# Patient Record
Sex: FEMALE | URBAN - METROPOLITAN AREA
[De-identification: names, ages, dates, MRNs, and addresses within clinical notes are randomized per-mention and may not be internally consistent; named-entity substitution may affect disease eponyms.]

---

## 2017-08-01 ENCOUNTER — EMERGENCY (EMERGENCY)
Facility: HOSPITAL | Age: 57
LOS: 1 days | Discharge: OTHER INSTITUTION | End: 2017-08-01
Attending: STUDENT IN AN ORGANIZED HEALTH CARE EDUCATION/TRAINING PROGRAM
Payer: COMMERCIAL

## 2017-08-01 VITALS
HEIGHT: 64 IN | TEMPERATURE: 98 F | DIASTOLIC BLOOD PRESSURE: 108 MMHG | SYSTOLIC BLOOD PRESSURE: 179 MMHG | WEIGHT: 139.99 LBS | HEART RATE: 80 BPM | OXYGEN SATURATION: 100 % | RESPIRATION RATE: 17 BRPM

## 2017-08-01 DIAGNOSIS — Z98.89 OTHER SPECIFIED POSTPROCEDURAL STATES: Chronic | ICD-10-CM

## 2017-08-01 LAB
APAP SERPL-MCNC: <7.5 UG/ML — LOW (ref 10–26)
BASOPHILS # BLD AUTO: 0 K/UL — SIGNIFICANT CHANGE UP (ref 0–0.2)
BASOPHILS NFR BLD AUTO: 0.3 % — SIGNIFICANT CHANGE UP (ref 0–2)
EOSINOPHIL # BLD AUTO: 0.2 K/UL — SIGNIFICANT CHANGE UP (ref 0–0.5)
EOSINOPHIL NFR BLD AUTO: 1.4 % — SIGNIFICANT CHANGE UP (ref 0–6)
ETHANOL SERPL-MCNC: <10 MG/DL — SIGNIFICANT CHANGE UP
HCG SERPL-ACNC: <2 MIU/ML — SIGNIFICANT CHANGE UP
HCT VFR BLD CALC: 43.3 % — SIGNIFICANT CHANGE UP (ref 37–47)
HGB BLD-MCNC: 14.8 G/DL — SIGNIFICANT CHANGE UP (ref 12–16)
LYMPHOCYTES # BLD AUTO: 3.5 K/UL — SIGNIFICANT CHANGE UP (ref 1–4.8)
LYMPHOCYTES # BLD AUTO: 33 % — SIGNIFICANT CHANGE UP (ref 20–55)
MCHC RBC-ENTMCNC: 30.3 PG — SIGNIFICANT CHANGE UP (ref 27–31)
MCHC RBC-ENTMCNC: 34.2 G/DL — SIGNIFICANT CHANGE UP (ref 32–36)
MCV RBC AUTO: 88.7 FL — SIGNIFICANT CHANGE UP (ref 81–99)
MONOCYTES # BLD AUTO: 0.6 K/UL — SIGNIFICANT CHANGE UP (ref 0–0.8)
MONOCYTES NFR BLD AUTO: 5.4 % — SIGNIFICANT CHANGE UP (ref 3–10)
NEUTROPHILS # BLD AUTO: 6.4 K/UL — SIGNIFICANT CHANGE UP (ref 1.8–8)
NEUTROPHILS NFR BLD AUTO: 59.7 % — SIGNIFICANT CHANGE UP (ref 37–73)
PLATELET # BLD AUTO: 224 K/UL — SIGNIFICANT CHANGE UP (ref 150–400)
RBC # BLD: 4.88 M/UL — SIGNIFICANT CHANGE UP (ref 4.4–5.2)
RBC # FLD: 13.5 % — SIGNIFICANT CHANGE UP (ref 11–15.6)
SALICYLATES SERPL-MCNC: <2 MG/DL — LOW (ref 10–20)
WBC # BLD: 10.7 K/UL — SIGNIFICANT CHANGE UP (ref 4.8–10.8)
WBC # FLD AUTO: 10.7 K/UL — SIGNIFICANT CHANGE UP (ref 4.8–10.8)

## 2017-08-01 PROCEDURE — 93010 ELECTROCARDIOGRAM REPORT: CPT

## 2017-08-01 PROCEDURE — 99285 EMERGENCY DEPT VISIT HI MDM: CPT

## 2017-08-01 RX ORDER — METFORMIN HYDROCHLORIDE 850 MG/1
1 TABLET ORAL
Qty: 0 | Refills: 0 | COMMUNITY

## 2017-08-01 RX ADMIN — Medication 1 MILLIGRAM(S): at 23:04

## 2017-08-01 NOTE — ED ADULT NURSE NOTE - CHIEF COMPLAINT QUOTE
Pt axox3 BIBA  from home stating she is having an allergic rx to " green powder " that someone is putting in her shoes and soap. No  respiratory distress noted, pt ambulatory and appropriate at this time. Pt denies any HI/SI and psych hx.   Eliseo used for assessment

## 2017-08-01 NOTE — ED PROVIDER NOTE - PROGRESS NOTE DETAILS
Labs normal. Neuro exam normal. pt cleared medically and will be evaluated by  for paranoid delusions As per signout from Dr. Mckinnon, patient is medically cleared and awaiting pyschiatric consultation. Case discussed with the psych team whom states that due to the patient's condition she will need admission. Forms completed. Awaiting transport to [facility]. As per sign-out from Dr. Mckinnon, patient is medically cleared and awaiting psychiatric consultation.

## 2017-08-01 NOTE — ED PROVIDER NOTE - MEDICAL DECISION MAKING DETAILS
MDM: psych vs intoxication ve electrolyte abnormality  labs and neuro exam normal. Likely psych etiology MDM: psych vs intoxication ve electrolyte abnormality  labs and neuro exam normal. Likely psych etiology. Psych to admit

## 2017-08-01 NOTE — ED ADULT TRIAGE NOTE - CHIEF COMPLAINT QUOTE
Pt axox3 BIBA  from home stating she is having an allergic rx to " green powder " that someone is putting in her shoes. No  respiratory distress noted, pt ambulatory and appropriate at this time. Pt denies any HI/SI Pt axox3 BIBA  from home stating she is having an allergic rx to " green powder " that someone is putting in her shoes and soap. No  respiratory distress noted, pt ambulatory and appropriate at this time. Pt denies any HI/SI and psych hx.   Eliseo used for assessment

## 2017-08-01 NOTE — ED PROVIDER NOTE - CARE PLAN
Principal Discharge DX:	Paranoid delusion Principal Discharge DX:	Paranoid delusion  Secondary Diagnosis:	Severe single current episode of major depressive disorder, with psychotic features

## 2017-08-01 NOTE — ED PROVIDER NOTE - OBJECTIVE STATEMENT
57 year old female with PMH Dm presenting with sensory disturbances. Pt states that she believes people to be "poisoning her by putting a green powder in my shoes and soap." She also states that for the past 3-4 days she has had sensory disturbances. She reports intermittent hot flashes and feeling of coldness. Sx last for minutes at a time, diffuse, intermittent, no alleviating/exacerbating factors. No associated fever, slurred speech, weakness, numbness, chest pain, SOB. Denies SI, HI

## 2017-08-01 NOTE — ED ADULT NURSE NOTE - OBJECTIVE STATEMENT
LAte entry : Sapnish  used for this assessment: Pt states she has this pressure in her head and " heaviness in her tounge  " for two - three months " cold feeling but hot feet", Denies SI/HI, tremors noted , no difficulty breathing or resp distress noted , good color, breathing easy and unlabored

## 2017-08-02 ENCOUNTER — INPATIENT (INPATIENT)
Facility: HOSPITAL | Age: 57
LOS: 0 days | Discharge: HOME | End: 2017-08-02
Attending: PSYCHIATRY & NEUROLOGY | Admitting: PSYCHIATRY & NEUROLOGY

## 2017-08-02 VITALS
HEART RATE: 72 BPM | TEMPERATURE: 99 F | RESPIRATION RATE: 18 BRPM | OXYGEN SATURATION: 98 % | SYSTOLIC BLOOD PRESSURE: 146 MMHG | DIASTOLIC BLOOD PRESSURE: 88 MMHG

## 2017-08-02 VITALS — HEART RATE: 72 BPM | TEMPERATURE: 98 F | WEIGHT: 141.98 LBS

## 2017-08-02 DIAGNOSIS — Z98.89 OTHER SPECIFIED POSTPROCEDURAL STATES: Chronic | ICD-10-CM

## 2017-08-02 DIAGNOSIS — F39 UNSPECIFIED MOOD [AFFECTIVE] DISORDER: ICD-10-CM

## 2017-08-02 DIAGNOSIS — F32.3 MAJOR DEPRESSIVE DISORDER, SINGLE EPISODE, SEVERE WITH PSYCHOTIC FEATURES: ICD-10-CM

## 2017-08-02 LAB
AMPHET UR-MCNC: NEGATIVE — SIGNIFICANT CHANGE UP
APPEARANCE UR: CLEAR — SIGNIFICANT CHANGE UP
BARBITURATES UR SCN-MCNC: NEGATIVE — SIGNIFICANT CHANGE UP
BENZODIAZ UR-MCNC: NEGATIVE — SIGNIFICANT CHANGE UP
BILIRUB UR-MCNC: NEGATIVE — SIGNIFICANT CHANGE UP
COCAINE METAB.OTHER UR-MCNC: NEGATIVE — SIGNIFICANT CHANGE UP
COLOR SPEC: YELLOW — SIGNIFICANT CHANGE UP
DIFF PNL FLD: ABNORMAL
EPI CELLS # UR: ABNORMAL
GLUCOSE UR QL: NEGATIVE MG/DL — SIGNIFICANT CHANGE UP
KETONES UR-MCNC: ABNORMAL
LEUKOCYTE ESTERASE UR-ACNC: ABNORMAL
METHADONE UR-MCNC: NEGATIVE — SIGNIFICANT CHANGE UP
NITRITE UR-MCNC: NEGATIVE — SIGNIFICANT CHANGE UP
OPIATES UR-MCNC: NEGATIVE — SIGNIFICANT CHANGE UP
PCP SPEC-MCNC: SIGNIFICANT CHANGE UP
PCP UR-MCNC: NEGATIVE — SIGNIFICANT CHANGE UP
PH UR: 6 — SIGNIFICANT CHANGE UP (ref 5–8)
PROT UR-MCNC: NEGATIVE MG/DL — SIGNIFICANT CHANGE UP
RBC CASTS # UR COMP ASSIST: SIGNIFICANT CHANGE UP /HPF (ref 0–4)
SP GR SPEC: 1.01 — SIGNIFICANT CHANGE UP (ref 1.01–1.02)
THC UR QL: NEGATIVE — SIGNIFICANT CHANGE UP
UROBILINOGEN FLD QL: NEGATIVE MG/DL — SIGNIFICANT CHANGE UP
WBC UR QL: SIGNIFICANT CHANGE UP

## 2017-08-02 PROCEDURE — 84702 CHORIONIC GONADOTROPIN TEST: CPT

## 2017-08-02 PROCEDURE — 84443 ASSAY THYROID STIM HORMONE: CPT

## 2017-08-02 PROCEDURE — 36415 COLL VENOUS BLD VENIPUNCTURE: CPT

## 2017-08-02 PROCEDURE — 99285 EMERGENCY DEPT VISIT HI MDM: CPT | Mod: 25

## 2017-08-02 PROCEDURE — 81001 URINALYSIS AUTO W/SCOPE: CPT

## 2017-08-02 PROCEDURE — T1013: CPT

## 2017-08-02 PROCEDURE — 80053 COMPREHEN METABOLIC PANEL: CPT

## 2017-08-02 PROCEDURE — 85027 COMPLETE CBC AUTOMATED: CPT

## 2017-08-02 PROCEDURE — 80048 BASIC METABOLIC PNL TOTAL CA: CPT

## 2017-08-02 PROCEDURE — 80307 DRUG TEST PRSMV CHEM ANLYZR: CPT

## 2017-08-02 PROCEDURE — 93005 ELECTROCARDIOGRAM TRACING: CPT

## 2017-08-02 NOTE — ED BEHAVIORAL HEALTH ASSESSMENT NOTE - HPI (INCLUDE ILLNESS QUALITY, SEVERITY, DURATION, TIMING, CONTEXT, MODIFYING FACTORS, ASSOCIATED SIGNS AND SYMPTOMS)
56 yo Polish speaking only F with no formal psychiatric history presents now with numerous somatic complaints and paranoid thoughts; in the context of 3-4 months of feeling sad, anxious and poor sleep. Pt's ED course was noted for her crying uncontrollably, which led to giving her Ativan which alleviated her emotional distress. Pt is primarily preoccupied with her physical health, complaining of various things happening to her such as a burning sensation in her feet, pressure in her head, swollen tongue and flashes of hot and cold throughout her body. 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 Pt was irritable as she spoke about this. States that she had to leave her job because of these fears and thoughts. Pt believes that people were attempting to poison her by putting a green powder in her shoes and soap. Cites that she lives alone, but somehow there is someone trying to hurt her and move furniture and other objects in her house without her permission. States that she doesn't know who this person is. Denies SI / NSSI. Denies AVH / IOR. Pt is quite irritable on exam and was very paranoid about her family. States that I can only speak to her children, but not her sister who lives next door. States that ever since she moved nearby her sister she's had all sorts of problems. Spoke of her in a very paranoid manner.     Attempted to obtain collateral from her children but all calls went to voicemails that were not established or the phone rang indefinitely.

## 2017-08-02 NOTE — ED BEHAVIORAL HEALTH ASSESSMENT NOTE - RISK ASSESSMENT
She is at high risk of acute and imminent danger to herself at this time; requires a more restrictive setting of care.

## 2017-08-02 NOTE — ED BEHAVIORAL HEALTH NOTE - BEHAVIORAL HEALTH NOTE
SW Note: Pt was transferred to [facility] unit Low 3 today 8/2/17. Accepting MD, Dr. Navneet Sotelo. Called pts ins provider for petra, [employer], [phone]. Spoke with Nathalia. Petra approved for 2 days 8/2/17 & 8/3/17. Auth# [id number] Clinicals were requested via fax, fax# [phone]. Fax sent. Info forwarded to [facility] dept, [phone], Pascale Leach for f/u.

## 2017-08-02 NOTE — ED BEHAVIORAL HEALTH ASSESSMENT NOTE - SUMMARY
56 yo Turkish speaking only F with no formal psychiatric history presents now with numerous somatic complaints and paranoid thoughts; in the context of 3-4 months of feeling sad, anxious and poor sleep. Currently, she continues to endorse what are likely paranoid and somatic delusions, both which have affected her life, while also having labile affect with irritability and tearfulness. Etiology of her presentation is not entirely clear, but likely due to an underlying mood component with psychotic features given the prominent paranoid and somatic preoccupations. She refuses to stay in the hospital, but is psychiatrically unstable and requires involuntary commitment.

## 2017-08-02 NOTE — ED BEHAVIORAL HEALTH ASSESSMENT NOTE - DESCRIPTION
Pt received Ativan 1mg after she was crying uncontrollably. n/a lives alone, previously worked in a factory packaging vitamins, from [place], [place] speaking only

## 2017-08-02 NOTE — ED ADULT NURSE REASSESSMENT NOTE - NS ED NURSE REASSESS COMMENT FT1
Assuming care from previous RN, pt AOx4, denies SOB, resp even and unlabored, LS clear and equal bilaterally, skin warm and dry, color good, c/o pain to left leg and and feels "burning in my feet and like my tongue is big", MD made aware, no swelling noticed to tongue, denies n/v, awaiting blood work results, pt aware of plan of care, will continue to monitor.
Pt awaiting ambulance for transfer, currently resting. Will continue to monitor and maintain safety.
Report given to  RN Reema, pt made aware of plan of care fully by MD Mckinnon w/ help of  Karissa, clothing to be locked away.
Pt was tele psych with  (Sanam). awaiting dispo to inpatient unit.
Pt currently resting, offers no complaints awaiting to be transferred to [place] [id number]
pt arrived to  alert and oriented, Serbian speaking only crying, paranoid, stating that she feels people are putting green powder in her shoes and having hot flashes in her feet, she denies any psych hx but states she has a brother in [place] that has Schizophrenia.  She denies A/V/T hallucinations and denies SI/HI at this present time.  Pt is crying uncontrollably, reassurance provided, MD prescribed Ativan 1mg by mouth patient accepted.   Pt will be tel psyched, awaiting urine collection.  Pt refuses any food offered  and fluids at this present time.

## 2017-08-02 NOTE — ED BEHAVIORAL HEALTH NOTE - BEHAVIORAL HEALTH NOTE
INOCENTE called pt's emergency contact (son- mabel stephens [phone]) unable to leave voicemail. INOCENTE also called alternative listed collateral (Marlo Spencer [phone]) phone was off, again unable to leave message

## 2024-04-11 NOTE — ED ADULT NURSE NOTE - CARDIO ASSESSMENT
Writer placed patient on telemetry box (TTX# [id number]). Called CTU to verify telemetry tech could see patient on monitor.     WDL

## 2024-12-23 ENCOUNTER — APPOINTMENT (OUTPATIENT)
Dept: CARDIOLOGY | Facility: CLINIC | Age: 64
End: 2024-12-23

## 2025-02-19 NOTE — ED ADULT TRIAGE NOTE - PAIN: PRESENCE, MLM
Received faxed copy of pt's episode summary report from [facility] dated 2/18/25 at 9:03am. Scanned into pt's media and placed on Dr. De La Fuente's desk for review.  
denies pain/discomfort